# Patient Record
Sex: MALE | Race: WHITE | NOT HISPANIC OR LATINO | Employment: FULL TIME | URBAN - METROPOLITAN AREA
[De-identification: names, ages, dates, MRNs, and addresses within clinical notes are randomized per-mention and may not be internally consistent; named-entity substitution may affect disease eponyms.]

---

## 2017-04-28 ENCOUNTER — ALLSCRIPTS OFFICE VISIT (OUTPATIENT)
Dept: OTHER | Facility: OTHER | Age: 22
End: 2017-04-28

## 2017-04-29 LAB
A/G RATIO (HISTORICAL): 2.1 (CALC) (ref 1–2.5)
ALBUMIN SERPL BCP-MCNC: 4.5 G/DL (ref 3.6–5.1)
ALP SERPL-CCNC: 67 U/L (ref 40–115)
ALT SERPL W P-5'-P-CCNC: 21 U/L (ref 9–46)
AST SERPL W P-5'-P-CCNC: 10 U/L (ref 10–40)
BACTERIA UR QL AUTO: NORMAL /HPF
BASOPHILS # BLD AUTO: 0.3 %
BASOPHILS # BLD AUTO: 24 CELLS/UL (ref 0–200)
BILIRUB SERPL-MCNC: 0.5 MG/DL (ref 0.2–1.2)
BILIRUB UR QL STRIP: NEGATIVE
BUN SERPL-MCNC: 14 MG/DL (ref 7–25)
BUN/CREA RATIO (HISTORICAL): NORMAL (CALC) (ref 6–22)
CALCIUM SERPL-MCNC: 9.5 MG/DL (ref 8.6–10.3)
CHLORIDE SERPL-SCNC: 105 MMOL/L (ref 98–110)
CO2 SERPL-SCNC: 26 MMOL/L (ref 20–31)
COLOR UR: YELLOW
COMMENT (HISTORICAL): CLEAR
CREAT SERPL-MCNC: 1.03 MG/DL (ref 0.6–1.35)
DEPRECATED RDW RBC AUTO: 14.3 % (ref 11–15)
EGFR AFRICAN AMERICAN (HISTORICAL): 119 ML/MIN/1.73M2
EGFR-AMERICAN CALC (HISTORICAL): 103 ML/MIN/1.73M2
EOSINOPHIL # BLD AUTO: 1.5 %
EOSINOPHIL # BLD AUTO: 119 CELLS/UL (ref 15–500)
FECAL OCCULT BLOOD DIAGNOSTIC (HISTORICAL): NEGATIVE
GAMMA GLOBULIN (HISTORICAL): 2.1 G/DL (CALC) (ref 1.9–3.7)
GLUCOSE (HISTORICAL): 82 MG/DL (ref 65–99)
GLUCOSE (HISTORICAL): NEGATIVE
HCT VFR BLD AUTO: 48.9 % (ref 38.5–50)
HGB BLD-MCNC: 15.9 G/DL (ref 13.2–17.1)
HYALINE CASTS #/AREA URNS LPF: NORMAL /LPF
KETONES UR STRIP-MCNC: NEGATIVE MG/DL
LEUKOCYTE ESTERASE UR QL STRIP: NEGATIVE
LYMPHOCYTES # BLD AUTO: 3042 CELLS/UL (ref 850–3900)
LYMPHOCYTES # BLD AUTO: 38.5 %
MCH RBC QN AUTO: 28.7 PG (ref 27–33)
MCHC RBC AUTO-ENTMCNC: 32.6 G/DL (ref 32–36)
MCV RBC AUTO: 88 FL (ref 80–100)
MONOCYTES # BLD AUTO: 482 CELLS/UL (ref 200–950)
MONOCYTES (HISTORICAL): 6.1 %
NEUTROPHILS # BLD AUTO: 4234 CELLS/UL (ref 1500–7800)
NEUTROPHILS # BLD AUTO: 53.6 %
NITRITE UR QL STRIP: NEGATIVE
PH UR STRIP.AUTO: 6 [PH] (ref 5–8)
PLATELET # BLD AUTO: 204 THOUSAND/UL (ref 140–400)
PMV BLD AUTO: 8.8 FL (ref 7.5–12.5)
POTASSIUM SERPL-SCNC: 4.2 MMOL/L (ref 3.5–5.3)
PROT UR STRIP-MCNC: NEGATIVE MG/DL
RBC # BLD AUTO: 5.56 MILLION/UL (ref 4.2–5.8)
RBC (HISTORICAL): NORMAL /HPF
SODIUM SERPL-SCNC: 140 MMOL/L (ref 135–146)
SP GR UR STRIP.AUTO: 1 (ref 1–1.03)
SQUAMOUS EPITHELIAL CELLS (HISTORICAL): NORMAL /HPF
TOTAL PROTEIN (HISTORICAL): 6.6 G/DL (ref 6.1–8.1)
WBC # BLD AUTO: 7.9 THOUSAND/UL (ref 3.8–10.8)
WBC # BLD AUTO: NORMAL /HPF

## 2017-05-03 ENCOUNTER — GENERIC CONVERSION - ENCOUNTER (OUTPATIENT)
Dept: OTHER | Facility: OTHER | Age: 22
End: 2017-05-03

## 2017-09-08 ENCOUNTER — ALLSCRIPTS OFFICE VISIT (OUTPATIENT)
Dept: OTHER | Facility: OTHER | Age: 22
End: 2017-09-08

## 2018-01-12 NOTE — RESULT NOTES
Verified Results  (1) CBC/PLT/DIFF 28Apr2017 12:28PM Pippa Sandoval   REPORT COMMENT:  FASTING:NO     Test Name Result Flag Reference   WHITE BLOOD CELL COUNT 7 9 Thousand/uL  3 8-10 8   RED BLOOD CELL COUNT 5 56 Million/uL  4 20-5 80   HEMOGLOBIN 15 9 g/dL  13 2-17 1   HEMATOCRIT 48 9 %  38 5-50 0   MCV 88 0 fL  80 0-100 0   MCH 28 7 pg  27 0-33 0   MCHC 32 6 g/dL  32 0-36 0   RDW 14 3 %  11 0-15 0   PLATELET COUNT 417 Thousand/uL  140-400   ABSOLUTE NEUTROPHILS 4234 cells/uL  8981-2164   ABSOLUTE LYMPHOCYTES 3042 cells/uL  850-3900   ABSOLUTE MONOCYTES 482 cells/uL  200-950   ABSOLUTE EOSINOPHILS 119 cells/uL     ABSOLUTE BASOPHILS 24 cells/uL  0-200   NEUTROPHILS 53 6 %     LYMPHOCYTES 38 5 %     MONOCYTES 6 1 %     EOSINOPHILS 1 5 %     BASOPHILS 0 3 %     MPV 8 8 fL  7 5-12 5     (1) COMPREHENSIVE METABOLIC PANEL [id number] 09:39IN Stella Aj     Test Name Result Flag Reference   GLUCOSE 82 mg/dL  65-99   Fasting reference interval   UREA NITROGEN (BUN) 14 mg/dL  7-25   CREATININE 1 03 mg/dL  0 60-1 35   eGFR NON-AFR   AMERICAN 103 mL/min/1 73m2  > OR = 60   eGFR AFRICAN AMERICAN 119 mL/min/1 73m2  > OR = 60   BUN/CREATININE RATIO   0-59   NOT APPLICABLE (calc)   SODIUM 140 mmol/L  135-146   POTASSIUM 4 2 mmol/L  3 5-5 3   CHLORIDE 105 mmol/L     CARBON DIOXIDE 26 mmol/L  20-31   CALCIUM 9 5 mg/dL  8 6-10 3   PROTEIN, TOTAL 6 6 g/dL  6 1-8 1   ALBUMIN 4 5 g/dL  3 6-5 1   GLOBULIN 2 1 g/dL (calc)  1 9-3 7   ALBUMIN/GLOBULIN RATIO 2 1 (calc)  1 0-2 5   BILIRUBIN, TOTAL 0 5 mg/dL  0 2-1 2   ALKALINE PHOSPHATASE 67 U/L     AST 10 U/L  10-40   ALT 21 U/L  9-46     (1) URINALYSIS (will reflex a microscopy if leukocytes, occult blood, protein or nitrites are not within normal limits) 28Apr2017 12:28PM [employer]     Test Name Result Flag Reference   SPECIFIC GRAVITY 1 004  1 001-1 035   BILIRUBIN NEGATIVE  NEGATIVE   GLUCOSE NEGATIVE  NEGATIVE   COLOR YELLOW  YELLOW   APPEARANCE CLEAR CLEAR   PH 6 0  5 0-8 0   KETONES NEGATIVE  NEGATIVE   OCCULT BLOOD NEGATIVE  NEGATIVE   PROTEIN NEGATIVE  NEGATIVE   NITRITE NEGATIVE  NEGATIVE   LEUKOCYTE ESTERASE NEGATIVE  NEGATIVE   SQUAMOUS EPITHELIAL CELLS NONE SEEN /HPF  < OR = 5   BACTERIA NONE SEEN /HPF  NONE SEEN   HYALINE CAST NONE SEEN /LPF  NONE SEEN   WBC NONE SEEN /HPF  < OR = 5   RBC NONE SEEN /HPF  < OR = 2

## 2018-01-14 VITALS
DIASTOLIC BLOOD PRESSURE: 82 MMHG | SYSTOLIC BLOOD PRESSURE: 122 MMHG | BODY MASS INDEX: 25.47 KG/M2 | HEIGHT: 72 IN | WEIGHT: 188 LBS | HEART RATE: 79 BPM | OXYGEN SATURATION: 97 %

## 2018-01-14 VITALS
SYSTOLIC BLOOD PRESSURE: 118 MMHG | DIASTOLIC BLOOD PRESSURE: 76 MMHG | HEIGHT: 72 IN | BODY MASS INDEX: 25.19 KG/M2 | WEIGHT: 186 LBS

## 2018-06-22 DIAGNOSIS — J03.90 TONSILLITIS: Primary | ICD-10-CM

## 2018-06-22 RX ORDER — AMOXICILLIN 500 MG/1
500 CAPSULE ORAL EVERY 8 HOURS SCHEDULED
Qty: 30 CAPSULE | Refills: 0 | Status: SHIPPED | OUTPATIENT
Start: 2018-06-22 | End: 2018-07-02

## 2020-01-13 ENCOUNTER — HOSPITAL ENCOUNTER (EMERGENCY)
Facility: HOSPITAL | Age: 25
Discharge: HOME | End: 2020-01-13
Attending: EMERGENCY MEDICINE | Admitting: EMERGENCY MEDICINE
Payer: COMMERCIAL

## 2020-01-13 ENCOUNTER — TELEPHONE (OUTPATIENT)
Dept: FAMILY MEDICINE CLINIC | Facility: CLINIC | Age: 25
End: 2020-01-13

## 2020-01-13 ENCOUNTER — APPOINTMENT (EMERGENCY)
Dept: ULTRASOUND IMAGING | Facility: HOSPITAL | Age: 25
End: 2020-01-13
Payer: COMMERCIAL

## 2020-01-13 VITALS
WEIGHT: 200.38 LBS | TEMPERATURE: 98.8 F | SYSTOLIC BLOOD PRESSURE: 131 MMHG | BODY MASS INDEX: 27.14 KG/M2 | RESPIRATION RATE: 18 BRPM | DIASTOLIC BLOOD PRESSURE: 81 MMHG | HEIGHT: 72 IN | OXYGEN SATURATION: 96 % | HEART RATE: 102 BPM

## 2020-01-13 DIAGNOSIS — N45.1 EPIDIDYMITIS: Primary | ICD-10-CM

## 2020-01-13 LAB
AMORPH URATE CRY URNS QL MICRO: ABNORMAL /HPF
ANION GAP SERPL CALCULATED.3IONS-SCNC: 9 MMOL/L (ref 4–13)
APTT PPP: 29 SECONDS (ref 23–37)
BACTERIA UR QL AUTO: ABNORMAL /HPF
BASOPHILS # BLD AUTO: 0.05 THOUSANDS/ΜL (ref 0–0.1)
BASOPHILS NFR BLD AUTO: 0 % (ref 0–1)
BILIRUB UR QL STRIP: NEGATIVE
BUN SERPL-MCNC: 12 MG/DL (ref 5–25)
CALCIUM SERPL-MCNC: 9.5 MG/DL (ref 8.3–10.1)
CHLORIDE SERPL-SCNC: 103 MMOL/L (ref 100–108)
CLARITY UR: ABNORMAL
CO2 SERPL-SCNC: 28 MMOL/L (ref 21–32)
COLOR UR: YELLOW
CREAT SERPL-MCNC: 0.92 MG/DL (ref 0.6–1.3)
EOSINOPHIL # BLD AUTO: 0.02 THOUSAND/ΜL (ref 0–0.61)
EOSINOPHIL NFR BLD AUTO: 0 % (ref 0–6)
ERYTHROCYTE [DISTWIDTH] IN BLOOD BY AUTOMATED COUNT: 12.3 % (ref 11.6–15.1)
GFR SERPL CREATININE-BSD FRML MDRD: 116 ML/MIN/1.73SQ M
GLUCOSE SERPL-MCNC: 137 MG/DL (ref 65–140)
GLUCOSE UR STRIP-MCNC: NEGATIVE MG/DL
HCT VFR BLD AUTO: 43.2 % (ref 36.5–49.3)
HGB BLD-MCNC: 14.9 G/DL (ref 12–17)
HGB UR QL STRIP.AUTO: ABNORMAL
IMM GRANULOCYTES # BLD AUTO: 0.11 THOUSAND/UL (ref 0–0.2)
IMM GRANULOCYTES NFR BLD AUTO: 1 % (ref 0–2)
INR PPP: 1.03 (ref 0.84–1.19)
KETONES UR STRIP-MCNC: ABNORMAL MG/DL
LEUKOCYTE ESTERASE UR QL STRIP: ABNORMAL
LYMPHOCYTES # BLD AUTO: 1.98 THOUSANDS/ΜL (ref 0.6–4.47)
LYMPHOCYTES NFR BLD AUTO: 11 % (ref 14–44)
MCH RBC QN AUTO: 29.9 PG (ref 26.8–34.3)
MCHC RBC AUTO-ENTMCNC: 34.5 G/DL (ref 31.4–37.4)
MCV RBC AUTO: 87 FL (ref 82–98)
MONOCYTES # BLD AUTO: 0.64 THOUSAND/ΜL (ref 0.17–1.22)
MONOCYTES NFR BLD AUTO: 4 % (ref 4–12)
NEUTROPHILS # BLD AUTO: 15.16 THOUSANDS/ΜL (ref 1.85–7.62)
NEUTS SEG NFR BLD AUTO: 84 % (ref 43–75)
NITRITE UR QL STRIP: POSITIVE
NON-SQ EPI CELLS URNS QL MICRO: ABNORMAL /HPF
NRBC BLD AUTO-RTO: 0 /100 WBCS
OTHER STN SPEC: ABNORMAL
PH UR STRIP.AUTO: 6 [PH]
PLATELET # BLD AUTO: 248 THOUSANDS/UL (ref 149–390)
PMV BLD AUTO: 10.2 FL (ref 8.9–12.7)
POTASSIUM SERPL-SCNC: 3.5 MMOL/L (ref 3.5–5.3)
PROT UR STRIP-MCNC: NEGATIVE MG/DL
PROTHROMBIN TIME: 13.2 SECONDS (ref 11.6–14.5)
RBC # BLD AUTO: 4.99 MILLION/UL (ref 3.88–5.62)
RBC #/AREA URNS AUTO: ABNORMAL /HPF
SODIUM SERPL-SCNC: 140 MMOL/L (ref 136–145)
SP GR UR STRIP.AUTO: 1.02 (ref 1–1.03)
UROBILINOGEN UR QL STRIP.AUTO: 0.2 E.U./DL
WBC # BLD AUTO: 17.96 THOUSAND/UL (ref 4.31–10.16)
WBC #/AREA URNS AUTO: ABNORMAL /HPF

## 2020-01-13 PROCEDURE — 76870 US EXAM SCROTUM: CPT

## 2020-01-13 PROCEDURE — 87086 URINE CULTURE/COLONY COUNT: CPT | Performed by: EMERGENCY MEDICINE

## 2020-01-13 PROCEDURE — 85610 PROTHROMBIN TIME: CPT | Performed by: EMERGENCY MEDICINE

## 2020-01-13 PROCEDURE — 99285 EMERGENCY DEPT VISIT HI MDM: CPT | Performed by: EMERGENCY MEDICINE

## 2020-01-13 PROCEDURE — 87491 CHLMYD TRACH DNA AMP PROBE: CPT | Performed by: EMERGENCY MEDICINE

## 2020-01-13 PROCEDURE — 36415 COLL VENOUS BLD VENIPUNCTURE: CPT | Performed by: EMERGENCY MEDICINE

## 2020-01-13 PROCEDURE — 87077 CULTURE AEROBIC IDENTIFY: CPT | Performed by: EMERGENCY MEDICINE

## 2020-01-13 PROCEDURE — 81001 URINALYSIS AUTO W/SCOPE: CPT | Performed by: EMERGENCY MEDICINE

## 2020-01-13 PROCEDURE — 96374 THER/PROPH/DIAG INJ IV PUSH: CPT

## 2020-01-13 PROCEDURE — 87591 N.GONORRHOEAE DNA AMP PROB: CPT | Performed by: EMERGENCY MEDICINE

## 2020-01-13 PROCEDURE — 85730 THROMBOPLASTIN TIME PARTIAL: CPT | Performed by: EMERGENCY MEDICINE

## 2020-01-13 PROCEDURE — 96375 TX/PRO/DX INJ NEW DRUG ADDON: CPT

## 2020-01-13 PROCEDURE — 99284 EMERGENCY DEPT VISIT MOD MDM: CPT

## 2020-01-13 PROCEDURE — 80048 BASIC METABOLIC PNL TOTAL CA: CPT | Performed by: EMERGENCY MEDICINE

## 2020-01-13 PROCEDURE — 85025 COMPLETE CBC W/AUTO DIFF WBC: CPT | Performed by: EMERGENCY MEDICINE

## 2020-01-13 PROCEDURE — 87186 SC STD MICRODIL/AGAR DIL: CPT | Performed by: EMERGENCY MEDICINE

## 2020-01-13 RX ORDER — DOXYCYCLINE HYCLATE 100 MG/1
100 CAPSULE ORAL 2 TIMES DAILY
Qty: 20 CAPSULE | Refills: 0 | Status: SHIPPED | OUTPATIENT
Start: 2020-01-13 | End: 2020-01-23

## 2020-01-13 RX ORDER — MORPHINE SULFATE 4 MG/ML
4 INJECTION, SOLUTION INTRAMUSCULAR; INTRAVENOUS ONCE
Status: COMPLETED | OUTPATIENT
Start: 2020-01-13 | End: 2020-01-13

## 2020-01-13 RX ORDER — DOXYCYCLINE HYCLATE 100 MG/1
100 CAPSULE ORAL ONCE
Status: COMPLETED | OUTPATIENT
Start: 2020-01-13 | End: 2020-01-13

## 2020-01-13 RX ORDER — ONDANSETRON 2 MG/ML
4 INJECTION INTRAMUSCULAR; INTRAVENOUS ONCE
Status: COMPLETED | OUTPATIENT
Start: 2020-01-13 | End: 2020-01-13

## 2020-01-13 RX ADMIN — DOXYCYCLINE HYCLATE 100 MG: 100 CAPSULE ORAL at 15:28

## 2020-01-13 RX ADMIN — ONDANSETRON 4 MG: 2 INJECTION INTRAMUSCULAR; INTRAVENOUS at 13:23

## 2020-01-13 RX ADMIN — MORPHINE SULFATE 4 MG: 4 INJECTION INTRAVENOUS at 13:24

## 2020-01-13 RX ADMIN — LIDOCAINE HYDROCHLORIDE 250 MG: 10 INJECTION, SOLUTION EPIDURAL; INFILTRATION; INTRACAUDAL; PERINEURAL at 15:30

## 2020-01-13 NOTE — ED PROVIDER NOTES
History  Chief Complaint   Patient presents with    Testicle Pain     Patient presents with left sided testicle pain upon waking  reports that it is sharp  tylenol ineffective  denies discharge from penis  Denies new sexual partners  no swelling  hx of torsion      This is a 51-year-old male who presents with left testicle pain that started this morning fairly acutely and significantly pain is improved at this time but still very uncomfortable to palpation denies any urinary symptoms no fevers or chills does have some nausea he does have a history testicular torsion when he was in the 3rd grade but he is unsure of which side      History provided by:  Patient and relative  Testicle Pain   Location:  Left testicle  Quality:  Pain  Severity:  Severe  Onset quality:  Sudden  Duration: Since 8:00 a m  Timing:  Constant  Progression:  Improving  Chronicity:  New  Context:  Left testicle pain since 8 o'clock this morning with some nausea  Relieved by:  Nothing  Worsened by:  Palpation  Associated symptoms: nausea        None       Past Medical History:   Diagnosis Date    Torsion, testicular        Past Surgical History:   Procedure Laterality Date    WISDOM TOOTH EXTRACTION         No family history on file  I have reviewed and agree with the history as documented  Social History     Tobacco Use    Smoking status: Never Smoker    Smokeless tobacco: Never Used   Substance Use Topics    Alcohol use: Yes     Alcohol/week: 8 0 standard drinks     Types: 8 Cans of beer per week    Drug use: Not Currently        Review of Systems   Gastrointestinal: Positive for nausea  Genitourinary: Positive for testicular pain  All other systems reviewed and are negative  Physical Exam  Physical Exam   Constitutional: He is oriented to person, place, and time  He appears well-developed and well-nourished  No distress  HENT:   Head: Normocephalic and atraumatic     Right Ear: External ear normal    Left Ear: External ear normal    Nose: Nose normal    Mouth/Throat: Oropharynx is clear and moist    Eyes: Pupils are equal, round, and reactive to light  EOM are normal  Right eye exhibits no discharge  Left eye exhibits no discharge  No scleral icterus  Neck: Neck supple  No JVD present  No tracheal deviation present  Cardiovascular: Regular rhythm and intact distal pulses  Exam reveals no gallop and no friction rub  No murmur heard  Pulmonary/Chest: Effort normal and breath sounds normal  No stridor  No respiratory distress  He has no wheezes  He has no rales  Abdominal: Soft  Bowel sounds are normal  He exhibits no distension  There is no tenderness  Genitourinary:   Genitourinary Comments: Cream Easter reflex brisk on the right but not on the left side left testicle is enlarged and tender to palpation no erythema or skin changes no penile lesions no masses   Musculoskeletal: Normal range of motion  He exhibits tenderness  He exhibits no edema or deformity  Neurological: He is alert and oriented to person, place, and time  No cranial nerve deficit or sensory deficit  He exhibits normal muscle tone  Coordination normal    Skin: Skin is warm and dry  He is not diaphoretic  Psychiatric: He has a normal mood and affect  His behavior is normal    Nursing note and vitals reviewed        Vital Signs  ED Triage Vitals [01/13/20 1217]   Temperature Pulse Respirations Blood Pressure SpO2   98 8 °F (37 1 °C) 102 18 131/81 96 %      Temp Source Heart Rate Source Patient Position - Orthostatic VS BP Location FiO2 (%)   Temporal Monitor Sitting Left arm --      Pain Score       5           Vitals:    01/13/20 1217   BP: 131/81   Pulse: 102   Patient Position - Orthostatic VS: Sitting         Visual Acuity      ED Medications  Medications   cefTRIAXone (ROCEPHIN) 250 mg in lidocaine (PF) (XYLOCAINE-MPF) 1 % IM only syringe (has no administration in time range)   doxycycline hyclate (VIBRAMYCIN) capsule 100 mg (has no administration in time range)   morphine (PF) 4 mg/mL injection 4 mg (4 mg Intravenous Given 1/13/20 1324)   ondansetron (ZOFRAN) injection 4 mg (4 mg Intravenous Given 1/13/20 1323)       Diagnostic Studies  Results Reviewed     Procedure Component Value Units Date/Time    Chlamydia/GC amplified DNA by PCR [[id number]] Collected:  01/13/20 1422    Lab Status: In process Specimen:  Urine, Other Updated:  01/13/20 1456    Urine Microscopic [[id number]]  (Abnormal) Collected:  01/13/20 1422    Lab Status:  Final result Specimen:  Urine, Clean Catch Updated:  01/13/20 1442     RBC, UA 1-2 /hpf      WBC, UA 30-50 /hpf      Epithelial Cells Occasional /hpf      Bacteria, UA Innumerable /hpf      AMORPH URATES Occasional /hpf      OTHER OBSERVATIONS WBCs Clumped  Renal Epithelial Cells Present    Urine culture [[id number]] Collected:  01/13/20 1422    Lab Status:   In process Specimen:  Urine, Clean Catch Updated:  01/13/20 1442    UA w Reflex to Microscopic w Reflex to Culture [[id number]]  (Abnormal) Collected:  01/13/20 1422    Lab Status:  Final result Specimen:  Urine, Clean Catch Updated:  01/13/20 1428     Color, UA Yellow     Clarity, UA Cloudy     Specific Gravity, UA 1 025     pH, UA 6 0     Leukocytes, UA Moderate     Nitrite, UA Positive     Protein, UA Negative mg/dl      Glucose, UA Negative mg/dl      Ketones, UA 15 (1+) mg/dl      Urobilinogen, UA 0 2 E U /dl      Bilirubin, UA Negative     Blood, UA Trace-Intact    Protime-INR [[id number]]  (Normal) Collected:  01/13/20 1327    Lab Status:  Final result Specimen:  Blood from Arm, Right Updated:  01/13/20 1348     Protime 13 2 seconds      INR 1 03    APTT [[id number]]  (Normal) Collected:  01/13/20 1327    Lab Status:  Final result Specimen:  Blood from Arm, Right Updated:  01/13/20 1348     PTT 29 seconds     Basic metabolic panel [[id number]] Collected:  01/13/20 1327    Lab Status:  Final result Specimen:  Blood from Arm, Right Updated:  01/13/20 1347     Sodium 140 mmol/L Potassium 3 5 mmol/L      Chloride 103 mmol/L      CO2 28 mmol/L      ANION GAP 9 mmol/L      BUN 12 mg/dL      Creatinine 0 92 mg/dL      Glucose 137 mg/dL      Calcium 9 5 mg/dL      eGFR 116 ml/min/1 73sq m     Narrative:       [place] guidelines for Chronic Kidney Disease (CKD):     Stage 1 with normal or high GFR (GFR > 90 mL/min/1 73 square meters)    Stage 2 Mild CKD (GFR = 60-89 mL/min/1 73 square meters)    Stage 3A Moderate CKD (GFR = 45-59 mL/min/1 73 square meters)    Stage 3B Moderate CKD (GFR = 30-44 mL/min/1 73 square meters)    Stage 4 Severe CKD (GFR = 15-29 mL/min/1 73 square meters)    Stage 5 End Stage CKD (GFR <15 mL/min/1 73 square meters)  Note: GFR calculation is accurate only with a steady state creatinine    CBC and differential [[id number]]  (Abnormal) Collected:  01/13/20 1327    Lab Status:  Final result Specimen:  Blood from Arm, Right Updated:  01/13/20 1335     WBC 17 96 Thousand/uL      RBC 4 99 Million/uL      Hemoglobin 14 9 g/dL      Hematocrit 43 2 %      MCV 87 fL      MCH 29 9 pg      MCHC 34 5 g/dL      RDW 12 3 %      MPV 10 2 fL      Platelets 328 Thousands/uL      nRBC 0 /100 WBCs      Neutrophils Relative 84 %      Immat GRANS % 1 %      Lymphocytes Relative 11 %      Monocytes Relative 4 %      Eosinophils Relative 0 %      Basophils Relative 0 %      Neutrophils Absolute 15 16 Thousands/µL      Immature Grans Absolute 0 11 Thousand/uL      Lymphocytes Absolute 1 98 Thousands/µL      Monocytes Absolute 0 64 Thousand/µL      Eosinophils Absolute 0 02 Thousand/µL      Basophils Absolute 0 05 Thousands/µL                  US testes with doppler   Final Result by Felipa Leahy MD (01/13 1413)       Heterogeneous enlargement of the left epididymis with associated hyperemia compatible with epididymitis  No adjacent collections identified         Workstation performed: [id number]                    Procedures  Procedures         ED Course  ED Course as of Jan 13 1521 Mon Jan 13, 2020   1315 Ultrasound notified of need for study                                  MDM  Number of Diagnoses or Management Options  Diagnosis management comments: Left testicle pain rule out torsion ultrasound ordered       Amount and/or Complexity of Data Reviewed  Clinical lab tests: ordered  Tests in the radiology section of CPT®: ordered          Disposition  Final diagnoses:   Epididymitis     Time reflects when diagnosis was documented in both MDM as applicable and the Disposition within this note     Time User Action Codes Description Comment    1/13/2020  3:19 PM Stella Magallanes Add [N45 1] Epididymitis       ED Disposition     ED Disposition Condition Date/Time Comment    Discharge Stable Mon Jan 13, 2020  3:19 PM Xi Parrish discharge to home/self care  Follow-up Information     Follow up With Specialties Details Why Contact Info    Elder Rainey MD Family Medicine In 1 week [address] [facility] [place] [phone]            Patient's Medications   Discharge Prescriptions    DOXYCYCLINE HYCLATE (VIBRAMYCIN) 100 MG CAPSULE    Take 1 capsule (100 mg total) by mouth 2 (two) times a day for 10 days       Start Date: 1/13/2020 End Date: 1/23/2020       Order Dose: 100 mg       Quantity: 20 capsule    Refills: 0     No discharge procedures on file      ED Provider  Electronically Signed by           Glynn Harrison DO  01/13/20 1521

## 2020-01-14 DIAGNOSIS — N45.1 EPIDIDYMITIS: Primary | ICD-10-CM

## 2020-01-14 RX ORDER — TRAMADOL HYDROCHLORIDE 50 MG/1
50 TABLET ORAL EVERY 6 HOURS PRN
Qty: 30 TABLET | Refills: 0 | Status: SHIPPED | OUTPATIENT
Start: 2020-01-14

## 2020-01-14 NOTE — TELEPHONE ENCOUNTER
PC pt's mom- seen at [facility] ER yesterday for epidydimitis  Was given an antibiotic to use  They did not give him any medication for pain relief  He is using Tylenol/Motrin with no relief  Patient states his pain is back at a 10/10  Mom is asking for pain meds for patient to give him some relief  Would you like to evaluate him? Also asking for a note for work to not to be able to lift  I asked mom what amount of weight to put a limit on and she said that it was up to us? Pm to review

## 2020-01-14 NOTE — TELEPHONE ENCOUNTER
Pt's mom aware, will send tramadol to [facility], work note for no lifting greater then 15 pounds

## 2020-01-14 NOTE — TELEPHONE ENCOUNTER
Be sure using 800 mg ibuprofen every 6-8 hours  If that is not helpful, could give him tramadol to use assuming no allergies  Should be wearing supportive underwear or athletic supporter  Also warm compress to the area may help  Can check in 2-3 days if not improving

## 2020-01-14 NOTE — LETTER
January 14, 2020     Patient: Mery Fill   YOB: 1995   Date of Visit: 1/14/2020       To Whom it May Concern:    Fay Cantor is under my professional care  He was seen in the ED on 01/13/2020  He may return to work with limitations NO LIFTING GREATER THAN 15 POUNDS  If you have any questions or concerns, please don't hesitate to call           Sincerely,          Steven Avila MD

## 2020-01-15 ENCOUNTER — TRANSCRIBE ORDERS (OUTPATIENT)
Dept: ADMINISTRATIVE | Facility: HOSPITAL | Age: 25
End: 2020-01-15

## 2020-01-15 ENCOUNTER — HOSPITAL ENCOUNTER (OUTPATIENT)
Dept: ULTRASOUND IMAGING | Facility: HOSPITAL | Age: 25
Discharge: HOME | End: 2020-01-15
Payer: COMMERCIAL

## 2020-01-15 ENCOUNTER — OFFICE VISIT (OUTPATIENT)
Dept: FAMILY MEDICINE CLINIC | Facility: CLINIC | Age: 25
End: 2020-01-15
Payer: COMMERCIAL

## 2020-01-15 ENCOUNTER — TELEPHONE (OUTPATIENT)
Dept: UROLOGY | Facility: HOSPITAL | Age: 25
End: 2020-01-15

## 2020-01-15 VITALS
HEART RATE: 96 BPM | OXYGEN SATURATION: 100 % | DIASTOLIC BLOOD PRESSURE: 82 MMHG | SYSTOLIC BLOOD PRESSURE: 124 MMHG | BODY MASS INDEX: 26.51 KG/M2 | WEIGHT: 200 LBS | HEIGHT: 73 IN

## 2020-01-15 DIAGNOSIS — N45.1 EPIDIDYMITIS: ICD-10-CM

## 2020-01-15 DIAGNOSIS — N45.1 EPIDIDYMITIS: Primary | ICD-10-CM

## 2020-01-15 DIAGNOSIS — N50.89 SWELLING OF LEFT TESTICLE: ICD-10-CM

## 2020-01-15 LAB
BACTERIA UR CULT: ABNORMAL
C TRACH DNA SPEC QL NAA+PROBE: NEGATIVE
N GONORRHOEA DNA SPEC QL NAA+PROBE: NEGATIVE

## 2020-01-15 PROCEDURE — 99214 OFFICE O/P EST MOD 30 MIN: CPT | Performed by: FAMILY MEDICINE

## 2020-01-15 PROCEDURE — 1036F TOBACCO NON-USER: CPT | Performed by: FAMILY MEDICINE

## 2020-01-15 PROCEDURE — 76870 US EXAM SCROTUM: CPT

## 2020-01-15 PROCEDURE — 3008F BODY MASS INDEX DOCD: CPT | Performed by: FAMILY MEDICINE

## 2020-01-15 RX ORDER — CIPROFLOXACIN 500 MG/1
500 TABLET, FILM COATED ORAL EVERY 12 HOURS SCHEDULED
Qty: 20 TABLET | Refills: 0 | Status: SHIPPED | OUTPATIENT
Start: 2020-01-15 | End: 2020-01-25

## 2020-01-15 NOTE — PROGRESS NOTES
[id number]        NAME: Amanda Vasquez is a 25 y o  male  : 1995    MRN: [id number]  DATE: January 15, 2020  TIME: 10:38 AM    Assessment and Plan   Epididymitis [N45 1]  1  Epididymitis  US testes with doppler    ciprofloxacin (CIPRO) 500 mg tablet   2  Swelling of left testicle  US testes with doppler   3  BMI 26 0-26 9,adult         No problem-specific Assessment & Plan notes found for this encounter  Patient Instructions     Patient Instructions   Will get repeat testicle ultrasound with Doppler to rule out torsion  Had contact with nurse practitioner urologist office  If Doppler is okay will switch to Cipro  Weight Management   AMBULATORY CARE:   Why it is important to manage your weight:  Being overweight increases your risk of health conditions such as heart disease, high blood pressure, type 2 diabetes, and certain types of cancer  It can also increase your risk for osteoarthritis, sleep apnea, and other respiratory problems  Aim for a slow, steady weight loss  Even a small amount of weight loss can lower your risk of health problems  How to lose weight safely:  A safe and healthy way to lose weight is to eat fewer calories and get regular exercise  You can lose up about 1 pound a week by decreasing the number of calories you eat by 500 calories each day  You can decrease calories by eating smaller portion sizes or by cutting out high-calorie foods  Read labels to find out how many calories are in the foods you eat  You can also burn calories with exercise such as walking, swimming, or biking  You will be more likely to keep weight off if you make these changes part of your lifestyle  Healthy meal plan for weight management:  A healthy meal plan includes a variety of foods, contains fewer calories, and helps you stay healthy  A healthy meal plan includes the following:  · Eat whole-grain foods more often  A healthy meal plan should contain fiber  Fiber is the part of grains, fruits, and vegetables that is not broken down by your body  Whole-grain foods are healthy and provide extra fiber in your diet  Some examples of whole-grain foods are whole-wheat breads and pastas, oatmeal, brown rice, and bulgur  · Eat a variety of vegetables every day  Include dark, leafy greens such as spinach, kale, destiney greens, and mustard greens  Eat yellow and orange vegetables such as carrots, sweet potatoes, and winter squash  · Eat a variety of fruits every day  Choose fresh or canned fruit (canned in its own juice or light syrup) instead of juice  Fruit juice has very little or no fiber  · Eat low-fat dairy foods  Drink fat-free (skim) milk or 1% milk  Eat fat-free yogurt and low-fat cottage cheese  Try low-fat cheeses such as mozzarella and other reduced-fat cheeses  · Choose meat and other protein foods that are low in fat  Choose beans or other legumes such as split peas or lentils  Choose fish, skinless poultry (chicken or turkey), or lean cuts of red meat (beef or pork)  Before you cook meat or poultry, cut off any visible fat  · Use less fat and oil  Try baking foods instead of frying them  Add less fat, such as margarine, sour cream, regular salad dressing and mayonnaise to foods  Eat fewer high-fat foods  Some examples of high-fat foods include french fries, doughnuts, ice cream, and cakes  · Eat fewer sweets  Limit foods and drinks that are high in sugar  This includes candy, cookies, regular soda, and sweetened drinks  Ways to decrease calories:   · Eat smaller portions  ¨ Use a small plate with smaller servings  ¨ Do not eat second helpings  ¨ When you eat at a restaurant, ask for a box and place half of your meal in the box before you eat  ¨ Share an entrée with someone else  · Replace high-calorie snacks with healthy, low-calorie snacks       ¨ Choose fresh fruit, vegetables, fat-free rice cakes, or air-popped popcorn instead of potato chips, nuts, or chocolate  ¨ Choose water or calorie-free drinks instead of soda or sweetened drinks  · Eat regular meals  Skipping meals can lead to overeating later in the day  Eat a healthy snack in place of a meal if you do not have time to eat a regular meal      · Do not shop for groceries when you are hungry  You may be more likely to make unhealthy food choices  Take a grocery list of healthy foods and shop after you have eaten  Exercise:  Exercise at least 30 minutes per day on most days of the week  Some examples of exercise include walking, biking, dancing, and swimming  You can also fit in more physical activity by taking the stairs instead of the elevator or parking farther away from stores  Ask your healthcare provider about the best exercise plan for you  Other things to consider as you try to lose weight:   · Be aware of situations that may give you the urge to overeat, such as eating while watching television  Find ways to avoid these situations  For example, read a book, go for a walk, or do crafts  · Meet with a weight loss support group or friends who are also trying to lose weight  This may help you stay motivated to continue working on your weight loss goals  © 2017 [employer]  Information is for End User's use only and may not be sold, redistributed or otherwise used for commercial purposes  All illustrations and images included in CareNotes® are the copyrighted property of [employer]  or [employer]  The above information is an  only  It is not intended as medical advice for individual conditions or treatments  Talk to your doctor, nurse or pharmacist before following any medical regimen to see if it is safe and effective for you              Chief Complaint     Chief Complaint   Patient presents with    Testicle Pain     and swelling          History of Present Illness       Patient here to follow-up on left testicular pain  Patient been in the emergency room 2 days ago  Diagnosis epididymitis  Ultrasound with Doppler at that time was okay except for mild swelling epididymitis  Since that time patient had significant amount of pain  The left testicle is now swollen of to 2-3 times the size of the right side  No fevers are reported  He is having some pain up into the groin  Review of Systems   Review of Systems   Constitutional: Negative for appetite change, chills, diaphoresis, fatigue and fever  Respiratory: Negative for shortness of breath  Gastrointestinal: Positive for abdominal pain  Negative for rectal pain and vomiting  Genitourinary: Positive for scrotal swelling and testicular pain  Negative for discharge, dysuria, flank pain, frequency, hematuria and urgency  Musculoskeletal: Negative for back pain and myalgias  Neurological: Negative for dizziness, syncope and light-headedness  Psychiatric/Behavioral: Positive for sleep disturbance  Negative for behavioral problems and dysphoric mood  The patient is not nervous/anxious            Current Medications       Current Outpatient Medications:     ciprofloxacin (CIPRO) 500 mg tablet, Take 1 tablet (500 mg total) by mouth every 12 (twelve) hours for 10 days, Disp: 20 tablet, Rfl: 0    doxycycline hyclate (VIBRAMYCIN) 100 mg capsule, Take 1 capsule (100 mg total) by mouth 2 (two) times a day for 10 days, Disp: 20 capsule, Rfl: 0    traMADol (ULTRAM) 50 mg tablet, Take 1 tablet (50 mg total) by mouth every 6 (six) hours as needed for moderate pain, Disp: 30 tablet, Rfl: 0    Current Allergies     Allergies as of 01/15/2020    (No Known Allergies)            The following portions of the patient's history were reviewed and updated as appropriate: allergies, current medications, past family history, past medical history, past social history, past surgical history and problem list      Past Medical History:   Diagnosis Date    Torsion, testicular        Past Surgical History:   Procedure Laterality Date    WISDOM TOOTH EXTRACTION         History reviewed  No pertinent family history  Medications have been verified  Objective   /82   Pulse 96   Ht 6' 1" (1 854 m)   Wt 90 7 kg (200 lb)   SpO2 100%   BMI 26 39 kg/m²        Physical Exam     Physical Exam   Constitutional: He appears well-developed and well-nourished  He appears distressed  Cardiovascular: Normal rate, regular rhythm and normal heart sounds  No murmur heard  Pulmonary/Chest: Effort normal and breath sounds normal  No respiratory distress  Abdominal: Soft  Bowel sounds are normal  He exhibits no distension  There is no tenderness  No hernia  Hernia confirmed negative in the left inguinal area  Genitourinary: Penis normal  Right testis shows no swelling and no tenderness  Left testis shows swelling and tenderness  BMI Counseling: Body mass index is 26 39 kg/m²  The BMI is above normal  Nutrition recommendations include reducing portion sizes, decreasing overall calorie intake and 3-5 servings of fruits/vegetables daily

## 2020-01-15 NOTE — TELEPHONE ENCOUNTER
Spoke with patient who declined Urology follow up  Stated results from hospital US today looked like everything was ok, no torsion noted  Patient would prefer to continue to take new antibiotic as prescribed and follow up with PCP

## 2020-01-15 NOTE — TELEPHONE ENCOUNTER
Received call from patient's PCP, Dr Kristen Beaver, this morning at 930  He reported increased scrotal swelling, despite being on doxycycline which was started on 1/13/2020 by ER  Ultrasound had identified epididymitis, GC/chlamydia negative  He had reported persistent pain  Recommend obtaining repeat scrotal ultrasound, or evaluation at ER for concern for possible torsion  Would also recommend switching antibiotic to Cipro  Please call patient to schedule office visit next week for evaluation

## 2020-01-15 NOTE — PATIENT INSTRUCTIONS
Will get repeat testicle ultrasound with Doppler to rule out torsion  Had contact with nurse practitioner urologist office  If Doppler is okay will switch to Cipro  Weight Management   AMBULATORY CARE:   Why it is important to manage your weight:  Being overweight increases your risk of health conditions such as heart disease, high blood pressure, type 2 diabetes, and certain types of cancer  It can also increase your risk for osteoarthritis, sleep apnea, and other respiratory problems  Aim for a slow, steady weight loss  Even a small amount of weight loss can lower your risk of health problems  How to lose weight safely:  A safe and healthy way to lose weight is to eat fewer calories and get regular exercise  You can lose up about 1 pound a week by decreasing the number of calories you eat by 500 calories each day  You can decrease calories by eating smaller portion sizes or by cutting out high-calorie foods  Read labels to find out how many calories are in the foods you eat  You can also burn calories with exercise such as walking, swimming, or biking  You will be more likely to keep weight off if you make these changes part of your lifestyle  Healthy meal plan for weight management:  A healthy meal plan includes a variety of foods, contains fewer calories, and helps you stay healthy  A healthy meal plan includes the following:  · Eat whole-grain foods more often  A healthy meal plan should contain fiber  Fiber is the part of grains, fruits, and vegetables that is not broken down by your body  Whole-grain foods are healthy and provide extra fiber in your diet  Some examples of whole-grain foods are whole-wheat breads and pastas, oatmeal, brown rice, and bulgur  · Eat a variety of vegetables every day  Include dark, leafy greens such as spinach, kale, destiney greens, and mustard greens  Eat yellow and orange vegetables such as carrots, sweet potatoes, and winter squash       · Eat a variety of fruits every day  Choose fresh or canned fruit (canned in its own juice or light syrup) instead of juice  Fruit juice has very little or no fiber  · Eat low-fat dairy foods  Drink fat-free (skim) milk or 1% milk  Eat fat-free yogurt and low-fat cottage cheese  Try low-fat cheeses such as mozzarella and other reduced-fat cheeses  · Choose meat and other protein foods that are low in fat  Choose beans or other legumes such as split peas or lentils  Choose fish, skinless poultry (chicken or turkey), or lean cuts of red meat (beef or pork)  Before you cook meat or poultry, cut off any visible fat  · Use less fat and oil  Try baking foods instead of frying them  Add less fat, such as margarine, sour cream, regular salad dressing and mayonnaise to foods  Eat fewer high-fat foods  Some examples of high-fat foods include french fries, doughnuts, ice cream, and cakes  · Eat fewer sweets  Limit foods and drinks that are high in sugar  This includes candy, cookies, regular soda, and sweetened drinks  Ways to decrease calories:   · Eat smaller portions  ¨ Use a small plate with smaller servings  ¨ Do not eat second helpings  ¨ When you eat at a restaurant, ask for a box and place half of your meal in the box before you eat  ¨ Share an entrée with someone else  · Replace high-calorie snacks with healthy, low-calorie snacks  ¨ Choose fresh fruit, vegetables, fat-free rice cakes, or air-popped popcorn instead of potato chips, nuts, or chocolate  ¨ Choose water or calorie-free drinks instead of soda or sweetened drinks  · Eat regular meals  Skipping meals can lead to overeating later in the day  Eat a healthy snack in place of a meal if you do not have time to eat a regular meal      · Do not shop for groceries when you are hungry  You may be more likely to make unhealthy food choices  Take a grocery list of healthy foods and shop after you have eaten    Exercise:  Exercise at least 30 minutes per day on most days of the week  Some examples of exercise include walking, biking, dancing, and swimming  You can also fit in more physical activity by taking the stairs instead of the elevator or parking farther away from stores  Ask your healthcare provider about the best exercise plan for you  Other things to consider as you try to lose weight:   · Be aware of situations that may give you the urge to overeat, such as eating while watching television  Find ways to avoid these situations  For example, read a book, go for a walk, or do crafts  · Meet with a weight loss support group or friends who are also trying to lose weight  This may help you stay motivated to continue working on your weight loss goals  © 2017 [facility] Information is for End User's use only and may not be sold, redistributed or otherwise used for commercial purposes  All illustrations and images included in CareNotes® are the copyrighted property of [employer]  or [employer]  The above information is an  only  It is not intended as medical advice for individual conditions or treatments  Talk to your doctor, nurse or pharmacist before following any medical regimen to see if it is safe and effective for you

## 2020-01-16 ENCOUNTER — TELEPHONE (OUTPATIENT)
Dept: FAMILY MEDICINE CLINIC | Facility: CLINIC | Age: 25
End: 2020-01-16

## 2020-01-16 NOTE — RESULT ENCOUNTER NOTE
Call patient with lab result-call patient testicle still appears normal   The swelling muscle all be related to the epididymis infection  Switch to Cipro 500 twice daily for 10 days  Tight breeze or athletic supporter for support  Warm compresses or Sitz baths 10-15 minutes every 3-4 hours  Recheck here Friday or Saturday if swelling is not coming down  Can also schedule with Urology if they would prefer

## 2020-01-16 NOTE — LETTER
[address] [place] [address]  Dept: [phone]    January 16, 2020     Patient: Jonathan Romeo   YOB: 1995   Date of Visit: 1/15/2020       To Whom it May Concern:    Teresa Fothergill is under the care of Atilio Larry MD  He was seen in the office on 1/15/2020 and the ER on 1/13/2020  Izabellaisac Jose Carlos was unable to work starting 1/13/2020 and may return to work on 1/20/2020    If you have any questions or concerns, please don't hesitate to call           Sincerely,          Ulysses Cano, [facility])

## 2020-01-16 NOTE — TELEPHONE ENCOUNTER
Patient notified as per Dr Godfrey Brady  Patient states she started the Cipro yesterday and he feels he has seen a reduction in the swelling  He is requesting a work note  He did not go in on Mon- Wed 1/13 thru 1/15/2020 -- his next scheduled day is 1/23/2020 -- he requests we use a RTW date of 1/20/2020  He (or his mother) will  the note later today       ----- Message from Peyton Morris MD sent at 1/16/2020  7:30 AM EST -----  Call patient with lab result-call patient testicle still appears normal   The swelling muscle all be related to the epididymis infection  Switch to Cipro 500 twice daily for 10 days  Tight breeze or athletic supporter for support  Warm compresses or Sitz baths 10-15 minutes every 3-4 hours  Recheck here Friday or Saturday if swelling is not coming down  Can also schedule with Urology if they would prefer

## 2020-01-21 ENCOUNTER — TELEPHONE (OUTPATIENT)
Dept: FAMILY MEDICINE CLINIC | Facility: CLINIC | Age: 25
End: 2020-01-21

## 2020-01-21 NOTE — TELEPHONE ENCOUNTER
Pt mother call asking for the urine analysis  that was done on January 13,2020 can you please review in order to inform Mother results Thank you

## 2020-01-21 NOTE — TELEPHONE ENCOUNTER
Urine culture did show bacterial growth  Should be sensitive to the Cipro which she should be finishing up  No further treatment needed

## 2020-11-24 ENCOUNTER — TELEPHONE (OUTPATIENT)
Dept: OTHER | Facility: OTHER | Age: 25
End: 2020-11-24

## 2020-11-25 ENCOUNTER — OFFICE VISIT (OUTPATIENT)
Dept: URGENT CARE | Facility: CLINIC | Age: 25
End: 2020-11-25
Payer: COMMERCIAL

## 2020-11-25 VITALS — TEMPERATURE: 97.7 F | RESPIRATION RATE: 16 BRPM | OXYGEN SATURATION: 96 % | HEART RATE: 106 BPM

## 2020-11-25 DIAGNOSIS — R11.2 NAUSEA AND VOMITING, INTRACTABILITY OF VOMITING NOT SPECIFIED, UNSPECIFIED VOMITING TYPE: Primary | ICD-10-CM

## 2020-11-25 PROCEDURE — G0382 LEV 3 HOSP TYPE B ED VISIT: HCPCS | Performed by: PHYSICIAN ASSISTANT

## 2020-11-25 PROCEDURE — U0003 INFECTIOUS AGENT DETECTION BY NUCLEIC ACID (DNA OR RNA); SEVERE ACUTE RESPIRATORY SYNDROME CORONAVIRUS 2 (SARS-COV-2) (CORONAVIRUS DISEASE [COVID-19]), AMPLIFIED PROBE TECHNIQUE, MAKING USE OF HIGH THROUGHPUT TECHNOLOGIES AS DESCRIBED BY CMS-2020-01-R: HCPCS | Performed by: PHYSICIAN ASSISTANT

## 2020-11-25 RX ORDER — ONDANSETRON 4 MG/1
4 TABLET, ORALLY DISINTEGRATING ORAL EVERY 6 HOURS PRN
Qty: 10 TABLET | Refills: 0 | Status: SHIPPED | OUTPATIENT
Start: 2020-11-25

## 2020-11-27 LAB — SARS-COV-2 RNA SPEC QL NAA+PROBE: NOT DETECTED
